# Patient Record
Sex: FEMALE | Race: ASIAN
[De-identification: names, ages, dates, MRNs, and addresses within clinical notes are randomized per-mention and may not be internally consistent; named-entity substitution may affect disease eponyms.]

---

## 2018-10-17 ENCOUNTER — HOSPITAL ENCOUNTER (EMERGENCY)
Dept: HOSPITAL 80 - FED | Age: 23
LOS: 1 days | Discharge: HOME | End: 2018-10-18
Payer: COMMERCIAL

## 2018-10-17 DIAGNOSIS — F10.920: Primary | ICD-10-CM

## 2018-10-17 DIAGNOSIS — E86.9: ICD-10-CM
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
## 2018-10-17 LAB — PLATELET # BLD: 352 10^3/UL (ref 150–400)

## 2018-10-17 PROCEDURE — G0480 DRUG TEST DEF 1-7 CLASSES: HCPCS

## 2018-10-17 NOTE — EDPHY
H & P


Time Seen by Provider: 10/17/18 22:29


HPI/ROS: 





HPI





CHIEF COMPLAINT:  Alcohol Intoxication 





HISTORY OF PRESENT ILLNESS:  23-year-old female, presents emergency room by EMS 

highly intoxicated with alcohol.  Patient was at the [facility] [facility] where she was 

found passed out.  Vomit all over her soft.  Friends report that she had a 

large amount of alcohol this evening.  No comma ingestions reported.  No 

trauma.  Patient presents emergency room highly intoxicated alcohol.  Unable to 

obtain history.





Past Medical History:  Unknown





Past Surgical History:  Unknown





Social History:  Large amount of alcohol per friends.





Family History:  Unknown








ROS   


REVIEW OF SYSTEMS:


10 Systems were reviewed and negative with the exception of the elements 

mentioned in the history of present illness.








Exam   


Constitutional   Intoxicated, triage nursing summary reviewed, vital signs 

reviewed, Sleepy, smells of alcohol


Eyes   normal conjunctivae and sclera, horizontal beating nystagmus consistent 

acute alcohol intoxication, otherwise pupils equal and react to light


HENT   normal inspection, atraumatic, moist mucus membranes, no epistaxis, neck 

supple/ no meningismus, no raccoon eyes. 


Respiratory   clear to auscultation bilaterally, normal breath sounds, no 

respiratory distress, no wheezing. 


Cardiovascular   rate normal, regular rhythm, no murmur, no edema, distal 

pulses normal. 


Gastrointestinal   soft, non-tender, no rebound, no guarding, normal bowel 

sounds, no distension, no pulsatile mass. 


Genitourinary   no CVA tenderness. 


Musculoskeletal  no midline vertebral tenderness, full range of motion, no calf 

swelling, no tenderness of extremities, no meningismus, good pulses, 

neurovascularly intact.


Skin   pink, warm, & dry, no rash, skin atraumatic. 


Neurologic   sleepy, intoxicated with alcohol,, alert and oriented x 3, AAOx3, 

moves all 4 extremities equally, motor intact, sensory intact, CN II-XII intact

, , normal vision, normal speech. 


Psychiatric   normal mood/affect. 


Heme/Lymph/Immune   no lymphadenopathy.





Differential Diagnosis:  Includes but is not limited to in a particular order 

acute alcohol intoxication, alcohol abuse, dehydration, electrolyte abnormality

, nausea vomiting from acute alcohol intoxication





Medical Decision Making:  Plan for this patient cardiac monitor, pulse ox, IV 

establishment IV fluid bolus, IV Zofran for nausea check electrolytes and serum 

alcohol level.  Monitor for worsening of condition.





Re-evaluation:








Serum alcohol level 278





Repeat chemistry as the 1st 2 were incorrect.  Lab error.  The repeat chemistry 

is appropriate within appropriate K. 








0336:  Patient ambulated well throughout the emergency room.  Stable gait.  She 

is on a [facility] hold. will go to [facility] for rest of recovery.


Source: Patient, EMS


Constitutional: 


 Initial Vital Signs











Temperature (C)  36.6 C   10/17/18 22:30


 


Heart Rate  88   10/17/18 22:30


 


Respiratory Rate  16   10/17/18 22:30


 


Blood Pressure  108/64   10/17/18 22:30


 


O2 Sat (%)  94   10/17/18 22:30








 











O2 Delivery Mode               Room Air














Allergies/Adverse Reactions: 


 





Unable to Assess Allergy (Unverified 10/17/18 22:40)


 








Home Medications: 














 Medication  Instructions  Recorded


 


Unobtainable  10/17/18














Medical Decision Making





- Data Points


Laboratory Results: 


 Laboratory Results





 10/17/18 22:30 





 10/18/18 01:05 





 











  10/18/18 10/18/18 10/17/18





  01:05 00:41 23:40


 


WBC      





    


 


RBC      





    


 


Hgb      





    


 


Hct      





    


 


MCV      





    


 


MCH      





    


 


MCHC      





    


 


RDW      





    


 


Plt Count      





    


 


MPV      





    


 


Neut % (Auto)      





    


 


Lymph % (Auto)      





    


 


Mono % (Auto)      





    


 


Eos % (Auto)      





    


 


Baso % (Auto)      





    


 


Nucleat RBC Rel Count      





    


 


Absolute Neuts (auto)      





    


 


Absolute Lymphs (auto)      





    


 


Absolute Monos (auto)      





    


 


Absolute Eos (auto)      





    


 


Absolute Basos (auto)      





    


 


Absolute Nucleated RBC      





    


 


Immature Gran %      





    


 


Immature Gran #      





    


 


RBC/WBC/PLT Morphology      





    


 


Platelet Estimate      





    


 


Sodium  142 mEq/L mEq/L    144 mEq/L mEq/L





   (135-145)    (135-145) 


 


Potassium  4.4 mEq/L mEq/L    3.1 mEq/L L mEq/L





   (3.3-5.0)    (3.3-5.0) 


 


Chloride  110 mEq/L mEq/L    112 mEq/L H mEq/L





   ()    () 


 


Carbon Dioxide  18 mEq/l L mEq/l    17 mEq/l L mEq/l





   (22-31)    (22-31) 


 


Anion Gap  14 mEq/L mEq/L    15 mEq/L H mEq/L





   (6-14)    (6-14) 


 


BUN  10 mg/dL mg/dL    10 mg/dL mg/dL





   (7-23)    (7-23) 


 


Creatinine  0.7 mg/dL mg/dL    0.7 mg/dL mg/dL





   (0.6-1.0)    (0.6-1.0) 


 


Estimated GFR  > 60     > 60 





    


 


Glucose  91 mg/dL mg/dL    64 mg/dL L mg/dL





   ()    () 


 


POC Glucose    119 mg/dL H mg/dL  





    ()  


 


Calcium  8.5 mg/dL mg/dL    8.5 mg/dL mg/dL





   (8.5-10.4)    (8.5-10.4) 


 


Specimen Hemolysis  114     





    


 


Ethyl Alcohol      





    














  10/17/18 10/17/18





  22:30 22:30


 


WBC    13.18 10^3/uL H 10^3/uL





    (3.80-9.50) 


 


RBC    4.58 10^6/uL 10^6/uL





    (4.18-5.33) 


 


Hgb    14.7 g/dL g/dL





    (12.6-16.3) 


 


Hct    42.5 % %





    (38.0-47.0) 


 


MCV    92.8 fL fL





    (81.5-99.8) 


 


MCH    32.1 pg pg





    (27.9-34.1) 


 


MCHC    34.6 g/dL g/dL





    (32.4-36.7) 


 


RDW    11.7 % %





    (11.5-15.2) 


 


Plt Count    352 10^3/uL 10^3/uL





    (150-400) 


 


MPV    9.3 fL fL





    (8.7-11.7) 


 


Neut % (Auto)    44.5 % %





    (39.3-74.2) 


 


Lymph % (Auto)    42.5 % %





    (15.0-45.0) 


 


Mono % (Auto)    6.2 % %





    (4.5-13.0) 


 


Eos % (Auto)    5.8 % %





    (0.6-7.6) 


 


Baso % (Auto)    0.7 % %





    (0.3-1.7) 


 


Nucleat RBC Rel Count    0.0 % %





    (0.0-0.2) 


 


Absolute Neuts (auto)    5.87 10^3/uL 10^3/uL





    (1.70-6.50) 


 


Absolute Lymphs (auto)    5.60 10^3/uL H 10^3/uL





    (1.00-3.00) 


 


Absolute Monos (auto)    0.82 10^3/uL H 10^3/uL





    (0.30-0.80) 


 


Absolute Eos (auto)    0.76 10^3/uL H 10^3/uL





    (0.03-0.40) 


 


Absolute Basos (auto)    0.09 10^3/uL 10^3/uL





    (0.02-0.10) 


 


Absolute Nucleated RBC    0.00 10^3/uL 10^3/uL





    (0-0.01) 


 


Immature Gran %    0.3 % %





    (0.0-1.1) 


 


Immature Gran #    0.04 10^3/uL 10^3/uL





    (0.00-0.10) 


 


RBC/WBC/PLT Morphology    TNP 





   


 


Platelet Estimate    TNP 





   


 


Sodium  141 mEq/L mEq/L  





   (135-145)  


 


Potassium  3.0 mEq/L L mEq/L  





   (3.3-5.0)  


 


Chloride  108 mEq/L mEq/L  





   ()  


 


Carbon Dioxide  13 mEq/l L mEq/l  





   (22-31)  


 


Anion Gap  20 mEq/L H mEq/L  





   (6-14)  


 


BUN  11 mg/dL mg/dL  





   (7-23)  


 


Creatinine  0.9 mg/dL mg/dL  





   (0.6-1.0)  


 


Estimated GFR  > 60   





   


 


Glucose  205 mg/dL H mg/dL  





   ()  


 


POC Glucose    





   


 


Calcium  9.6 mg/dL mg/dL  





   (8.5-10.4)  


 


Specimen Hemolysis    





   


 


Ethyl Alcohol  278 mg/dL H mg/dL  





   (0-10)  











Medications Given: 


 








Discontinued Medications





Sodium Chloride (Ns)  1,000 mls @ 0 mls/hr IV EDNOW ONE; Wide Open


   PRN Reason: Protocol


   Stop: 10/17/18 22:29


   Last Admin: 10/17/18 22:45 Dose:  1,000 mls


Potassium Chloride (Potassium Cl 20 Meq (Premix))  100 mls @ 50 mls/hr IV EDNOW 

ONE


   Stop: 10/18/18 02:38


   Last Admin: 10/18/18 02:26 Dose:  Not Given


Ondansetron HCl (Zofran)  4 mg IVP EDNOW ONE


   Stop: 10/17/18 22:29


   Last Admin: 10/17/18 22:45 Dose:  4 mg





Point of Care Test Results: 


 Chemistry











  10/18/18





  00:41


 


POC Glucose  119 mg/dL H mg/dL





   () 














Departure





- Departure


Disposition: Home, Routine, Self-Care


Clinical Impression: 


 Alcoholic intoxication





Condition: Good


Instructions:  Alcohol Intoxication (ED), Abuse of Alcohol (ED)


Referrals: 


Patient,NotPresent [Primary Care Provider] - As per Instructions

## 2018-10-18 VITALS — DIASTOLIC BLOOD PRESSURE: 65 MMHG | SYSTOLIC BLOOD PRESSURE: 103 MMHG
